# Patient Record
Sex: MALE | Race: WHITE | NOT HISPANIC OR LATINO | URBAN - METROPOLITAN AREA
[De-identification: names, ages, dates, MRNs, and addresses within clinical notes are randomized per-mention and may not be internally consistent; named-entity substitution may affect disease eponyms.]

---

## 2020-02-15 ENCOUNTER — APPOINTMENT (RX ONLY)
Dept: URBAN - METROPOLITAN AREA CLINIC 48 | Facility: CLINIC | Age: 60
Setting detail: DERMATOLOGY
End: 2020-02-15

## 2020-02-15 DIAGNOSIS — L85.3 XEROSIS CUTIS: ICD-10-CM

## 2020-02-15 DIAGNOSIS — L24 IRRITANT CONTACT DERMATITIS: ICD-10-CM

## 2020-02-15 PROBLEM — L24.9 IRRITANT CONTACT DERMATITIS, UNSPECIFIED CAUSE: Status: ACTIVE | Noted: 2020-02-15

## 2020-02-15 PROCEDURE — ? COUNSELING

## 2020-02-15 PROCEDURE — ? PRESCRIPTION

## 2020-02-15 PROCEDURE — 99203 OFFICE O/P NEW LOW 30 MIN: CPT

## 2020-02-15 RX ORDER — TRIAMCINOLONE ACETONIDE 1 MG/G
CREAM TOPICAL BID
Qty: 1 | Refills: 2 | Status: ERX | COMMUNITY
Start: 2020-02-15

## 2020-02-15 RX ORDER — UREA 400 MG/G
1 CREAM TOPICAL BID
Qty: 1 | Refills: 2 | Status: ERX | COMMUNITY
Start: 2020-02-15

## 2020-02-15 RX ADMIN — UREA 1: 400 CREAM TOPICAL at 00:00

## 2020-02-15 RX ADMIN — TRIAMCINOLONE ACETONIDE: 1 CREAM TOPICAL at 00:00

## 2020-02-15 NOTE — PROCEDURE: MIPS QUALITY
Quality 130: Documentation Of Current Medications In The Medical Record: Current Medications Documented
Quality 131: Pain Assessment And Follow-Up: No documentation of pain assessment, reason not given
Quality 226: Preventive Care And Screening: Tobacco Use: Screening And Cessation Intervention: Patient screened for tobacco use, is a smoker AND received Cessation Counseling
Detail Level: Detailed
Quality 431: Preventive Care And Screening: Unhealthy Alcohol Use - Screening: Patient screened for unhealthy alcohol use using a single question and scores less than 2 times per year
Quality 110: Preventive Care And Screening: Influenza Immunization: Influenza immunization was not ordered or administered, reason not given
Quality 47: Advance Care Plan: Advance care planning not documented, reason not otherwise specified.

## 2020-02-29 ENCOUNTER — APPOINTMENT (RX ONLY)
Dept: URBAN - METROPOLITAN AREA CLINIC 48 | Facility: CLINIC | Age: 60
Setting detail: DERMATOLOGY
End: 2020-02-29

## 2020-02-29 DIAGNOSIS — L24 IRRITANT CONTACT DERMATITIS: ICD-10-CM

## 2020-02-29 DIAGNOSIS — L85.3 XEROSIS CUTIS: ICD-10-CM

## 2020-02-29 PROBLEM — L24.9 IRRITANT CONTACT DERMATITIS, UNSPECIFIED CAUSE: Status: ACTIVE | Noted: 2020-02-29

## 2020-02-29 PROCEDURE — ? TREATMENT REGIMEN

## 2020-02-29 PROCEDURE — ? COUNSELING

## 2020-02-29 PROCEDURE — ? PRESCRIPTION

## 2020-02-29 PROCEDURE — 99213 OFFICE O/P EST LOW 20 MIN: CPT

## 2020-02-29 RX ORDER — UREA 400 MG/G
CREAM TOPICAL BID
Qty: 1 | Refills: 0 | Status: ERX

## 2022-01-14 ENCOUNTER — OFFICE (OUTPATIENT)
Dept: URBAN - METROPOLITAN AREA CLINIC 106 | Facility: CLINIC | Age: 62
End: 2022-01-14

## 2022-01-14 DIAGNOSIS — R14.0 ABDOMINAL DISTENSION(GASEOUS): ICD-10-CM

## 2022-01-14 DIAGNOSIS — K59.00 CONSTIPATION: ICD-10-CM

## 2022-01-14 DIAGNOSIS — R11.0 NAUSEA: ICD-10-CM

## 2022-01-14 DIAGNOSIS — R10.30 LOWER ABDOMINAL PAIN: ICD-10-CM

## 2022-01-14 DIAGNOSIS — K92.1 MELENA: ICD-10-CM

## 2022-01-14 DIAGNOSIS — R10.32 LLQ PAIN: ICD-10-CM

## 2022-01-14 DIAGNOSIS — R31.9 BLOOD IN URINE: ICD-10-CM

## 2022-01-14 PROCEDURE — G0406 INPT/TELE FOLLOW UP 15: HCPCS | Performed by: INTERNAL MEDICINE

## 2022-01-14 PROCEDURE — 99204 OFFICE O/P NEW MOD 45 MIN: CPT | Performed by: INTERNAL MEDICINE

## 2022-01-14 RX ORDER — METRONIDAZOLE 500 MG/1
TABLET, FILM COATED ORAL
Qty: 21 | Status: ACTIVE
Start: 2022-01-14

## 2022-01-14 NOTE — SERVICEHPINOTES
The patient is scheduled today for a telehealth visit, due to current mandate for social distancing on account of the COVID-19 Pandemic. The clinician is connected to a secure network. The patient consents to this teleconference being a billable service.   This visit used [employer] for a live, interactive audio and video telehealth visit.   The patient complains of abdominal pain.    Symptoms began    ago with onset that was    intermittent  .    It is localized as   lower abdomen   pain.    It is described as   moderate   in nature.   Pain quality is described as   Pressure  .    It also radiates to the   epigastric region  .    Discomfort typically lasts   several  minutes   and has a course which is   intermittent  .   It usually starts   intermittently  .    Symptom triggers include   eating  .  Alleviating factors include   nothing specific  .    Symptoms have been   non-progressive/stable   since onset.    Alarm features noted:   none  .   The patient also reports   abdominal bloating/distension, diarrhea and constipation  .      Symptoms have caused the patient to   see primary care physician   Similar symptoms    occurred in the past, associated with    .   Noteworthy prior abdominal interventions include   .   has been performed previously to evaluate the patient's symptoms.   Remedies tried to date include     with    .    Other pertinent testing to date includes,
